# Patient Record
Sex: MALE | Race: WHITE | URBAN - METROPOLITAN AREA
[De-identification: names, ages, dates, MRNs, and addresses within clinical notes are randomized per-mention and may not be internally consistent; named-entity substitution may affect disease eponyms.]

---

## 2023-05-22 ENCOUNTER — OFFICE VISIT (OUTPATIENT)
Dept: URBAN - METROPOLITAN AREA CLINIC 23 | Facility: CLINIC | Age: 83
End: 2023-05-22
Payer: MEDICARE

## 2023-05-22 DIAGNOSIS — H25.13 AGE-RELATED NUCLEAR CATARACT, BILATERAL: Primary | ICD-10-CM

## 2023-05-22 PROCEDURE — 99204 OFFICE O/P NEW MOD 45 MIN: CPT | Performed by: OPHTHALMOLOGY

## 2023-05-22 ASSESSMENT — KERATOMETRY
OS: 46.25
OD: 46.38

## 2023-05-22 ASSESSMENT — VISUAL ACUITY
OD: 20/30
OS: 20/40

## 2023-05-22 ASSESSMENT — INTRAOCULAR PRESSURE
OS: 18
OD: 19

## 2023-05-22 NOTE — IMPRESSION/PLAN
Impression: Age-related nuclear cataract, bilateral: H25.13. Condition: quality of life issue. Symptoms: could improve with surgery. Vision: vision affected. Plan: Cataracts account for the patient's complaints. Discussed all risks, benefits, procedures and recovery. Patient has quality of life issues. Patient understands changing glasses will not improve vision. Patient desires to have surgery, recommend phacoemulsification with intraocular lens. CE w/IOL OD then OS. R/B/A discussed. RL2. Lens: Standard   Aim: [place] Current

## 2023-06-22 ENCOUNTER — TESTING ONLY (OUTPATIENT)
Dept: URBAN - METROPOLITAN AREA CLINIC 23 | Facility: CLINIC | Age: 83
End: 2023-06-22
Payer: MEDICARE

## 2023-06-22 DIAGNOSIS — H25.13 AGE-RELATED NUCLEAR CATARACT, BILATERAL: Primary | ICD-10-CM

## 2023-06-22 ASSESSMENT — PACHYMETRY
OD: 3.40
OD: 22.75
OS: 22.81
OS: 3.39

## 2023-07-06 ENCOUNTER — SURGERY (OUTPATIENT)
Dept: URBAN - METROPOLITAN AREA SURGERY 11 | Facility: SURGERY | Age: 83
End: 2023-07-06
Payer: MEDICARE

## 2023-07-06 PROCEDURE — PR1CP PR1CP: CUSTOM | Performed by: OPHTHALMOLOGY

## 2023-07-06 PROCEDURE — 66982 XCAPSL CTRC RMVL CPLX WO ECP: CPT | Performed by: OPHTHALMOLOGY

## 2023-07-07 ENCOUNTER — POST-OPERATIVE VISIT (OUTPATIENT)
Dept: URBAN - METROPOLITAN AREA CLINIC 23 | Facility: CLINIC | Age: 83
End: 2023-07-07
Payer: MEDICARE

## 2023-07-07 DIAGNOSIS — Z48.810 ENCOUNTER FOR SURGICAL AFTERCARE FOLLOWING SURGERY ON A SENSE ORGAN: Primary | ICD-10-CM

## 2023-07-07 ASSESSMENT — INTRAOCULAR PRESSURE
OS: 15
OD: 19

## 2023-07-07 NOTE — IMPRESSION/PLAN
Impression: S/P Cataract Extraction by phacoemulsification with IOL placement; ORA; DEXTENZA OD - 1 Day. Encounter for surgical aftercare following surgery on a sense organ  Z48.810. Plan: Reviewed Dextenza and PO instructions. Use AT PRN. May need supplemental steroid if inflammation occurs. Return sooner if vision becomes blurrier or pain increases.

## 2023-07-13 ENCOUNTER — POST-OPERATIVE VISIT (OUTPATIENT)
Dept: URBAN - METROPOLITAN AREA CLINIC 23 | Facility: CLINIC | Age: 83
End: 2023-07-13
Payer: MEDICARE

## 2023-07-13 DIAGNOSIS — Z48.810 ENCOUNTER FOR SURGICAL AFTERCARE FOLLOWING SURGERY ON A SENSE ORGAN: Primary | ICD-10-CM

## 2023-07-13 ASSESSMENT — INTRAOCULAR PRESSURE
OS: 9
OD: 9

## 2023-07-13 NOTE — IMPRESSION/PLAN
Impression: S/P CE/Standard IOL [id number] +21.50 w/ ORA, DEXTENZA OD - 7 Days. Encounter for surgical aftercare following surgery on a sense organ  Z48.810. Plan: Reviewed Dextenza and PO instructions, return to full activity. Use AT PRN. May need supplemental steroid if inflammation occurs. Okay to proceed with second eye. Return sooner if vision becomes blurrier or pain increases.

## 2023-08-18 ENCOUNTER — POST-OPERATIVE VISIT (OUTPATIENT)
Dept: URBAN - METROPOLITAN AREA CLINIC 23 | Facility: CLINIC | Age: 83
End: 2023-08-18
Payer: MEDICARE

## 2023-08-18 DIAGNOSIS — Z96.1 PRESENCE OF INTRAOCULAR LENS: Primary | ICD-10-CM

## 2023-08-18 PROCEDURE — 99024 POSTOP FOLLOW-UP VISIT: CPT | Performed by: OPTOMETRIST

## 2023-08-18 ASSESSMENT — INTRAOCULAR PRESSURE
OS: 9
OD: 9

## 2023-08-21 ENCOUNTER — POST-OPERATIVE VISIT (OUTPATIENT)
Dept: URBAN - METROPOLITAN AREA CLINIC 23 | Facility: CLINIC | Age: 83
End: 2023-08-21
Payer: MEDICARE

## 2023-08-21 DIAGNOSIS — Z96.1 PRESENCE OF INTRAOCULAR LENS: Primary | ICD-10-CM

## 2023-08-21 PROCEDURE — 99024 POSTOP FOLLOW-UP VISIT: CPT | Performed by: OPTOMETRIST

## 2023-08-21 RX ORDER — PREDNISOLONE ACETATE 10 MG/ML
1 % SUSPENSION/ DROPS OPHTHALMIC
Qty: 10 | Refills: 1 | Status: ACTIVE
Start: 2023-08-21

## 2023-08-21 ASSESSMENT — INTRAOCULAR PRESSURE
OD: 12
OS: 14

## 2023-08-21 ASSESSMENT — KERATOMETRY
OS: 45.38
OD: 44.63

## 2023-09-18 ENCOUNTER — POST-OPERATIVE VISIT (OUTPATIENT)
Dept: URBAN - METROPOLITAN AREA CLINIC 23 | Facility: CLINIC | Age: 83
End: 2023-09-18
Payer: MEDICARE

## 2023-09-18 DIAGNOSIS — Z96.1 PRESENCE OF INTRAOCULAR LENS: Primary | ICD-10-CM

## 2023-09-18 PROCEDURE — 99024 POSTOP FOLLOW-UP VISIT: CPT | Performed by: OPTOMETRIST

## 2023-09-18 ASSESSMENT — INTRAOCULAR PRESSURE
OS: 12
OD: 12

## 2023-11-08 ENCOUNTER — OFFICE VISIT (OUTPATIENT)
Dept: URBAN - METROPOLITAN AREA CLINIC 28 | Facility: CLINIC | Age: 83
End: 2023-11-08
Payer: MEDICARE

## 2023-11-08 DIAGNOSIS — H26.493 OTHER SECONDARY CATARACT, BILATERAL: Primary | ICD-10-CM

## 2023-11-08 DIAGNOSIS — H26.492 OTHER SECONDARY CATARACT, LEFT EYE: ICD-10-CM

## 2023-11-08 PROCEDURE — 99204 OFFICE O/P NEW MOD 45 MIN: CPT | Performed by: OPHTHALMOLOGY

## 2023-11-08 ASSESSMENT — KERATOMETRY
OD: 45.63
OS: 45.75

## 2023-11-08 ASSESSMENT — INTRAOCULAR PRESSURE
OS: 11
OD: 12

## 2023-11-08 ASSESSMENT — VISUAL ACUITY
OS: 20/20
OD: 20/20

## 2023-12-04 ENCOUNTER — POST-OPERATIVE VISIT (OUTPATIENT)
Dept: URBAN - METROPOLITAN AREA CLINIC 28 | Facility: CLINIC | Age: 83
End: 2023-12-04
Payer: MEDICARE

## 2023-12-04 DIAGNOSIS — Z48.810 ENCOUNTER FOR SURGICAL AFTERCARE FOLLOWING SURGERY ON A SENSE ORGAN: Primary | ICD-10-CM

## 2023-12-04 PROCEDURE — 99024 POSTOP FOLLOW-UP VISIT: CPT | Performed by: OPTOMETRIST

## 2023-12-04 ASSESSMENT — INTRAOCULAR PRESSURE
OS: 11
OD: 13